# Patient Record
Sex: MALE | Race: OTHER | NOT HISPANIC OR LATINO | URBAN - METROPOLITAN AREA
[De-identification: names, ages, dates, MRNs, and addresses within clinical notes are randomized per-mention and may not be internally consistent; named-entity substitution may affect disease eponyms.]

---

## 2022-02-28 NOTE — ASU PATIENT PROFILE, ADULT - FALL HARM RISK - UNIVERSAL INTERVENTIONS
Bed in lowest position, wheels locked, appropriate side rails in place/Call bell, personal items and telephone in reach/Instruct patient to call for assistance before getting out of bed or chair/Non-slip footwear when patient is out of bed/[place] to call system/Physically safe environment - no spills, clutter or unnecessary equipment/Purposeful Proactive Rounding/Room/bathroom lighting operational, light cord in reach

## 2022-02-28 NOTE — ASU PATIENT PROFILE, ADULT - NS SC CAGE ALCOHOL GUILTY ABOUT
Pt was seen x1 with  1-16-20  zolpidiem was filled 1-16-20 qty 30 no refills.  Ok for refill or forward to ?   no
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
## 2022-03-01 ENCOUNTER — OUTPATIENT (OUTPATIENT)
Dept: OUTPATIENT SERVICES | Facility: HOSPITAL | Age: 27
LOS: 1 days | Discharge: HOME | End: 2022-03-01
Payer: COMMERCIAL

## 2022-03-01 VITALS
RESPIRATION RATE: 16 BRPM | SYSTOLIC BLOOD PRESSURE: 141 MMHG | WEIGHT: 216.05 LBS | OXYGEN SATURATION: 98 % | HEIGHT: 68 IN | DIASTOLIC BLOOD PRESSURE: 73 MMHG | HEART RATE: 76 BPM | TEMPERATURE: 98 F

## 2022-03-01 VITALS
SYSTOLIC BLOOD PRESSURE: 138 MMHG | DIASTOLIC BLOOD PRESSURE: 86 MMHG | RESPIRATION RATE: 17 BRPM | TEMPERATURE: 97 F | HEART RATE: 84 BPM

## 2022-03-01 DIAGNOSIS — Z98.890 OTHER SPECIFIED POSTPROCEDURAL STATES: Chronic | ICD-10-CM

## 2022-03-01 PROCEDURE — 88300 SURGICAL PATH GROSS: CPT | Mod: 26

## 2022-03-01 RX ORDER — APREPITANT 80 MG/1
40 CAPSULE ORAL ONCE
Refills: 0 | Status: COMPLETED | OUTPATIENT
Start: 2022-03-01 | End: 2022-03-01

## 2022-03-01 RX ORDER — HYDROMORPHONE HYDROCHLORIDE 2 MG/ML
0.5 INJECTION INTRAMUSCULAR; INTRAVENOUS; SUBCUTANEOUS
Refills: 0 | Status: DISCONTINUED | OUTPATIENT
Start: 2022-03-01 | End: 2022-03-01

## 2022-03-01 RX ORDER — OXYCODONE HYDROCHLORIDE 5 MG/1
5 TABLET ORAL ONCE
Refills: 0 | Status: DISCONTINUED | OUTPATIENT
Start: 2022-03-01 | End: 2022-03-01

## 2022-03-01 RX ORDER — FENTANYL CITRATE 50 UG/ML
50 INJECTION INTRAVENOUS
Refills: 0 | Status: DISCONTINUED | OUTPATIENT
Start: 2022-03-01 | End: 2022-03-01

## 2022-03-01 RX ORDER — ACETAMINOPHEN 500 MG
650 TABLET ORAL ONCE
Refills: 0 | Status: COMPLETED | OUTPATIENT
Start: 2022-03-01 | End: 2022-03-01

## 2022-03-01 RX ORDER — SODIUM CHLORIDE 9 MG/ML
1000 INJECTION, SOLUTION INTRAVENOUS
Refills: 0 | Status: DISCONTINUED | OUTPATIENT
Start: 2022-03-01 | End: 2022-03-01

## 2022-03-01 RX ORDER — ONDANSETRON 8 MG/1
4 TABLET, FILM COATED ORAL ONCE
Refills: 0 | Status: DISCONTINUED | OUTPATIENT
Start: 2022-03-01 | End: 2022-03-01

## 2022-03-01 RX ADMIN — Medication 650 MILLIGRAM(S): at 09:50

## 2022-03-01 RX ADMIN — APREPITANT 40 MILLIGRAM(S): 80 CAPSULE ORAL at 09:57

## 2022-03-01 RX ADMIN — SODIUM CHLORIDE 100 MILLILITER(S): 9 INJECTION, SOLUTION INTRAVENOUS at 12:44

## 2022-03-01 NOTE — BRIEF OPERATIVE NOTE - OPERATION/FINDINGS
Severe left caudal septal deviation; large bony cartilaginous deflection and spur.  Excoriated left nasal mucosa

## 2022-03-01 NOTE — ASU DISCHARGE PLAN (ADULT/PEDIATRIC) - PROCEDURE
Septoplasty, [facility] IT BL Septoplasty, [facility] IT BL

## 2022-03-01 NOTE — ASU PREOP CHECKLIST - TEMPERATURE IN FAHRENHEIT (DEGREES F)
Consent: The patient's consent was obtained including but not limited to risks of crusting, scabbing, blistering, scarring, darker or lighter pigmentary change, recurrence, incomplete removal and infection. Detail Level: Detailed Render Post-Care Instructions In Note?: no Post-Care Instructions: I reviewed with the patient in detail post-care instructions. Patient is to wear sunprotection, and avoid picking at any of the treated lesions. Pt may apply Vaseline to crusted or scabbing areas. Duration Of Freeze Thaw-Cycle (Seconds): 0 Medical Necessity Information: It is in your best interest to select a reason for this procedure from the list below. All of these items fulfill various CMS LCD requirements except the new and changing color options. Medical Necessity Clause: This procedure was medically necessary because the lesions that were treated were: 98.5

## 2022-03-01 NOTE — ASU DISCHARGE PLAN (ADULT/PEDIATRIC) - NS MD DC FALL RISK RISK
For information on Fall & Injury Prevention, visit: [email or web address] OR  [email or web address] OR  https://www.cdc.gov/steadi/patient.html
For information on Fall & Injury Prevention, visit: [email or web address] OR  [email or web address] OR  https://www.cdc.gov/steadi/patient.html

## 2022-03-01 NOTE — BRIEF OPERATIVE NOTE - NSICDXBRIEFPROCEDURE_GEN_ALL_CORE_FT
PROCEDURES:  Septoplasty 01-Mar-2022 13:11:17  [address]  Resection, submucosa, inferior turbinate 01-Mar-2022 13:11:29  [address]

## 2022-03-01 NOTE — BRIEF OPERATIVE NOTE - NSICDXBRIEFPREOP_GEN_ALL_CORE_FT
PRE-OP DIAGNOSIS:  Nasal septal deviation 01-Mar-2022 13:11:43  [address]  Hypertrophy of inferior nasal turbinate 01-Mar-2022 13:11:52  [address]

## 2022-03-01 NOTE — ASU PREOP CHECKLIST - NS PREOP CHK MONITOR ANESTHESIA CONSENT
Preventive Care at the 2 Month Visit  Growth Measurements & Percentiles  Head Circumference:   No head circumference on file for this encounter.   Weight: 0 lbs 0 oz / Patient weight not available. / No weight on file for this encounter.   Length: Data Unavailable / 0 cm No height on file for this encounter.   Weight for length: No height and weight on file for this encounter.    Your baby s next Preventive Check-up will be at 4 months of age    Development  At this age, your baby may:    Raise her head slightly when lying on her stomach.    Fix on a face (prefers human) or object and follow movement.    Become quiet when she hears voices.    Smile responsively at another smiling face      Feeding Tips  Feed your baby breast milk or formula only.  Breast Milk    Nurse on demand     Resource for return to work in Lactation Education Resources.  Check out the handout on Employed Breastfeeding Mother.  [email or web address]/component/content/article/[email or web address]-tzapip-shohwxfu    Formula (general guidelines)    Never prop up a bottle to feed your baby.    Your baby does not need solid foods or water at this age.    The average baby eats every two to four hours.  Your baby may eat more or less often.  Your baby does not need to be  average  to be healthy and normal.      Age   # time/day   Serving Size     0-1 Month   6-8 times   2-4 oz     1-2 Months   5-7 times   3-5 oz     2-3 Months   4-6 times   4-7 oz     3-4 Months    4-6 times   5-8 oz     Stools    Your baby s stools can vary from once every five days to once every feeding.  Your baby s stool pattern may change as she grows.    Your baby s stools will be runny, yellow or green and  seedy.     Your baby s stools will have a variety of colors, consistencies and odors.    Your baby may appear to strain during a bowel movement, even if the stools are soft.  This can be normal.      Sleep    Put your baby to sleep on her back, not on her stomach.  This can  reduce the risk of sudden infant death syndrome (SIDS).    Babies sleep an average of 16 hours each day, but can vary between 9 and 22 hours.    At 2 months old, your baby may sleep up to 6 or 7 hours at night.    Talk to or play with your baby after daytime feedings.  Your baby will learn that daytime is for playing and staying awake while nighttime is for sleeping.      Safety    The car seat should be in the back seat facing backwards until your child weight more than 20 pounds and turns 2 years old.    Make sure the slats in your baby s crib are no more than 2 3/8 inches apart, and that it is not a drop-side crib.  Some old cribs are unsafe because a baby s head can become stuck between the slats.    Keep your baby away from fires, hot water, stoves, wood burners and other hot objects.    Do not let anyone smoke around your baby (or in your house or car) at any time.    Use properly working smoke detectors in your house, including the nursery.  Test your smoke detectors when daylight savings time begins and ends.    Have a carbon monoxide detector near the furnace area.    Never leave your baby alone, even for a few seconds, especially on a bed or changing table.  Your baby may not be able to roll over, but assume she can.    Never leave your baby alone in a car or with young siblings or pets.    Do not attach a pacifier to a string or cord.    Use a firm mattress.  Do not use soft or fluffy bedding, mats, pillows, or stuffed animals/toys.    Never shake your baby. If you feel frustrated,  take a break  - put your baby in a safe place (such as the crib) and step away.      When To Call Your Health Care Provider  Call your health care provider if your baby:    Has a rectal temperature of more than 100.4 F (38.0 C).    Eats less than usual or has a weak suck at the nipple.    Vomits or has diarrhea.    Acts irritable or sluggish.      What Your Baby Needs    Give your baby lots of eye contact and talk to your baby  often.    Hold, cradle and touch your baby a lot.  Skin-to-skin contact is important.  You cannot spoil your baby by holding or cuddling her.      What You Can Expect    You will likely be tired and busy.    If you are returning to work, you should think about .    You may feel overwhelmed, scared or exhausted.  Be sure to ask family or friends for help.    If you  feel blue  for more than 2 weeks, call your doctor.  You may have depression.    Being a parent is the biggest job you will ever have.  Support and information are important.  Reach out for help when you feel the need.         no

## 2022-03-08 LAB — SURGICAL PATHOLOGY STUDY: SIGNIFICANT CHANGE UP

## 2022-06-17 NOTE — PROVIDER CONTACT NOTE (OTHER) - REASON
No new care gaps identified.  [facility] Embedded Care Gaps. Reference number: [id number]. 6/16/2022   11:58:10 PM CDT   Bilateral calf pain

## (undated) DEVICE — SOL ANTI FOG

## (undated) DEVICE — SUT PROLENE 3-0 18" PS-2

## (undated) DEVICE — APPLICATOR COTTON TIP 6"

## (undated) DEVICE — DRSG TEGADERM 2.5X3"

## (undated) DEVICE — ELCTR COLORADO 3CM

## (undated) DEVICE — SUT PDS II 4-0 18" P-3

## (undated) DEVICE — SUT PLAIN GUT 4-0 18" SC-1

## (undated) DEVICE — WARMING BLANKET FULL ADULT

## (undated) DEVICE — SUT CHROMIC 6-0 18" S-14

## (undated) DEVICE — BLADE MEDTRONIC ENT INFERIOR TURBINATE ROTATABLE STRAIGHT 2MM X11CM

## (undated) DEVICE — DRAPE MAYO STAND 23"

## (undated) DEVICE — SUT CHROMIC 4-0 18" G-3

## (undated) DEVICE — ELCTR BOVIE PENCIL BLADE 10FT

## (undated) DEVICE — S&N ARTHROCARE ENT WAND TURBINATOR

## (undated) DEVICE — Device

## (undated) DEVICE — SUT PLAIN GUT FAST ABSORBING 5-0 PC-1

## (undated) DEVICE — TUBING SUCTION NONCONDUCTIVE 6MM X 12FT

## (undated) DEVICE — MARKING PEN W RULER

## (undated) DEVICE — SUT PROLENE 6-0 18" P-1

## (undated) DEVICE — PREP BETADINE SPONGE STICKS

## (undated) DEVICE — PACK RHINOPLASTY

## (undated) DEVICE — SUT PDS II 5-0 27" RB-1

## (undated) DEVICE — VENODYNE/SCD SLEEVE CALF MEDIUM

## (undated) DEVICE — DRSG AQUAPLAST BLANK BLUSH 3 X 3"

## (undated) DEVICE — GLV 7.5 PROTEXIS (WHITE)

## (undated) DEVICE — PETRI DISH MED 3.5"

## (undated) DEVICE — SUT PROLENE 6-0 18" RB-2

## (undated) DEVICE — DRSG 2X2